# Patient Record
Sex: FEMALE | Race: WHITE
[De-identification: names, ages, dates, MRNs, and addresses within clinical notes are randomized per-mention and may not be internally consistent; named-entity substitution may affect disease eponyms.]

---

## 2019-07-22 ENCOUNTER — HOSPITAL ENCOUNTER (OUTPATIENT)
Dept: HOSPITAL 10 - E/R | Age: 64
Setting detail: OBSERVATION
LOS: 1 days | Discharge: HOME | End: 2019-07-23
Attending: PEDIATRICS | Admitting: PEDIATRICS
Payer: COMMERCIAL

## 2019-07-22 ENCOUNTER — HOSPITAL ENCOUNTER (OUTPATIENT)
Dept: HOSPITAL 91 - E/R | Age: 64
Setting detail: OBSERVATION
LOS: 1 days | Discharge: HOME | End: 2019-07-23
Payer: COMMERCIAL

## 2019-07-22 VITALS
WEIGHT: 125.44 LBS | WEIGHT: 125.44 LBS | BODY MASS INDEX: 23.08 KG/M2 | HEIGHT: 62 IN | BODY MASS INDEX: 23.08 KG/M2 | HEIGHT: 62 IN

## 2019-07-22 DIAGNOSIS — R07.89: Primary | ICD-10-CM

## 2019-07-22 DIAGNOSIS — E78.5: ICD-10-CM

## 2019-07-22 DIAGNOSIS — I10: ICD-10-CM

## 2019-07-22 DIAGNOSIS — Z79.4: ICD-10-CM

## 2019-07-22 DIAGNOSIS — E11.9: ICD-10-CM

## 2019-07-22 LAB
ADD MAN DIFF?: NO
ALANINE AMINOTRANSFERASE: 31 IU/L (ref 13–69)
ALBUMIN/GLOBULIN RATIO: 1.35
ALBUMIN: 5 G/DL (ref 3.3–4.9)
ALKALINE PHOSPHATASE: 91 IU/L (ref 42–121)
ANION GAP: 11 (ref 5–13)
ASPARTATE AMINO TRANSFERASE: 30 IU/L (ref 15–46)
BASOPHIL #: 0.1 10^3/UL (ref 0–0.1)
BASOPHILS %: 0.7 % (ref 0–2)
BILIRUBIN,DIRECT: 0 MG/DL (ref 0–0.2)
BILIRUBIN,TOTAL: 0.7 MG/DL (ref 0.2–1.3)
BLOOD UREA NITROGEN: 20 MG/DL (ref 7–20)
CALCIUM: 10.2 MG/DL (ref 8.4–10.2)
CARBON DIOXIDE: 27 MMOL/L (ref 21–31)
CHLORIDE: 105 MMOL/L (ref 97–110)
CREATININE: 1.07 MG/DL (ref 0.44–1)
EOSINOPHILS #: 0.3 10^3/UL (ref 0–0.5)
EOSINOPHILS %: 2.8 % (ref 0–7)
GLOBULIN: 3.7 G/DL (ref 1.3–3.2)
GLUCOSE: 208 MG/DL (ref 70–220)
HEMATOCRIT: 42.9 % (ref 37–47)
HEMOGLOBIN: 14.2 G/DL (ref 12–16)
IMMATURE GRANS #M: 0.05 10^3/UL (ref 0–0.03)
IMMATURE GRANS % (M): 0.5 % (ref 0–0.43)
LIPASE: 194 U/L (ref 23–300)
LYMPHOCYTES #: 2.6 10^3/UL (ref 0.8–2.9)
LYMPHOCYTES %: 28.3 % (ref 15–51)
MEAN CORPUSCULAR HEMOGLOBIN: 28.1 PG (ref 29–33)
MEAN CORPUSCULAR HGB CONC: 33.1 G/DL (ref 32–37)
MEAN CORPUSCULAR VOLUME: 84.8 FL (ref 82–101)
MEAN PLATELET VOLUME: 9.4 FL (ref 7.4–10.4)
MONOCYTE #: 0.7 10^3/UL (ref 0.3–0.9)
MONOCYTES %: 7.1 % (ref 0–11)
NEUTROPHIL #: 5.6 10^3/UL (ref 1.6–7.5)
NEUTROPHILS %: 60.6 % (ref 39–77)
NUCLEATED RED BLOOD CELLS #: 0 10^3/UL (ref 0–0)
NUCLEATED RED BLOOD CELLS%: 0 /100WBC (ref 0–0)
PLATELET COUNT: 316 10^3/UL (ref 140–415)
POTASSIUM: 3.9 MMOL/L (ref 3.5–5.1)
RED BLOOD COUNT: 5.06 10^6/UL (ref 4.2–5.4)
RED CELL DISTRIBUTION WIDTH: 11.9 % (ref 11.5–14.5)
SODIUM: 143 MMOL/L (ref 135–144)
TOTAL PROTEIN: 8.7 G/DL (ref 6.1–8.1)
WHITE BLOOD COUNT: 9.2 10^3/UL (ref 4.8–10.8)

## 2019-07-22 PROCEDURE — 99285 EMERGENCY DEPT VISIT HI MDM: CPT

## 2019-07-22 PROCEDURE — 85378 FIBRIN DEGRADE SEMIQUANT: CPT

## 2019-07-22 PROCEDURE — 80053 COMPREHEN METABOLIC PANEL: CPT

## 2019-07-22 PROCEDURE — 85651 RBC SED RATE NONAUTOMATED: CPT

## 2019-07-22 PROCEDURE — 83036 HEMOGLOBIN GLYCOSYLATED A1C: CPT

## 2019-07-22 PROCEDURE — 82962 GLUCOSE BLOOD TEST: CPT

## 2019-07-22 PROCEDURE — 83690 ASSAY OF LIPASE: CPT

## 2019-07-22 PROCEDURE — 71045 X-RAY EXAM CHEST 1 VIEW: CPT

## 2019-07-22 PROCEDURE — 84443 ASSAY THYROID STIM HORMONE: CPT

## 2019-07-22 PROCEDURE — 82550 ASSAY OF CK (CPK): CPT

## 2019-07-22 PROCEDURE — 93005 ELECTROCARDIOGRAM TRACING: CPT

## 2019-07-22 PROCEDURE — 84484 ASSAY OF TROPONIN QUANT: CPT

## 2019-07-22 PROCEDURE — 81001 URINALYSIS AUTO W/SCOPE: CPT

## 2019-07-22 PROCEDURE — 85025 COMPLETE CBC W/AUTO DIFF WBC: CPT

## 2019-07-22 PROCEDURE — 96374 THER/PROPH/DIAG INJ IV PUSH: CPT

## 2019-07-22 PROCEDURE — 36415 COLL VENOUS BLD VENIPUNCTURE: CPT

## 2019-07-22 PROCEDURE — 82553 CREATINE MB FRACTION: CPT

## 2019-07-22 RX ADMIN — ASPIRIN 81 MG CHEWABLE TABLET 1 MG: 81 TABLET CHEWABLE at 23:30

## 2019-07-22 RX ADMIN — THIAMINE HYDROCHLORIDE 1 MLS/HR: 100 INJECTION, SOLUTION INTRAMUSCULAR; INTRAVENOUS at 23:30

## 2019-07-22 RX ADMIN — KETOROLAC TROMETHAMINE 1 MG: 15 INJECTION, SOLUTION INTRAMUSCULAR; INTRAVENOUS at 23:30

## 2019-07-22 NOTE — ERD
ER Documentation


Chief Complaint


Chief Complaint





on and off  cp x 1 day





HPI


This is a 63-year-old woman with a history of diabetes mellitus and hypertension


presenting with vague sharp nonexertional nonradiating substernal chest pain 


intermittent for 1 day usually lasting about 20 minutes.  She denies previous 


similar episodes.  She denies shortness of breath, no cough, no fevers or 


chills, no vomiting or diarrhea





ROS


All systems reviewed and are negative except as per history of present illness.





Allergies


Allergies:  


Coded Allergies:  


     No Known Drug Allergies (Verified  Allergy, Unknown, 7/22/19)





PMhx/Soc


Diabetes mellitus, hypertension





FmHx


Family History:  No diabetes





Physical Exam


Vitals





Vital Signs


  Date      Temp  Pulse  Resp  B/P (MAP)   Pulse Ox  O2          O2 Flow    FiO2


Time                                                 Delivery    Rate


   7/23/19           99    18      183/77        96  Room Air


     00:56                          (112)


   7/22/19  98.6    107    20      188/83        98


     22:41                          (118)





Physical Exam


GENERAL: Well-developed, well-nourished, well-hydrated, in no apparent distress,


looks nontoxic in appearance


CARDIAC: Regular rate and rhythm, no murmurs rubs or gallops


LUNGS: Clear bilaterally no wheezing crackles or stridor


ABDOMEN: Soft nontender, no guarding, no rigidity, no rebound, no psoas sign no 


obturator sign. 


SKIN: Warm and dry to touch, no abrasions, contusions, or hematomas, no 


lacerations, no ecchymosis, no target lesions, and without ulcers


EXTREMITIES: No clubbing cyanosis or edema, calves are bilaterally symmetrical, 


no Homans sign, no popliteal cord sign. Distal pulses equal and bilateral


PSYCH: Normal affect without agitation or irritability


Result Diagram:  


7/22/19 2320                                                                    


           7/22/19 2320





Results 24 hrs





Laboratory Tests


              Test
                                  7/22/19
23:20


              White Blood Count                       9.2 10^3/ul


              Red Blood Count                        5.06 10^6/ul


              Hemoglobin                                14.2 g/dl


              Hematocrit                                   42.9 %


              Mean Corpuscular Volume                     84.8 fl


              Mean Corpuscular Hemoglobin                 28.1 pg


              Mean Corpuscular Hemoglobin
Concent      33.1 g/dl 



              Red Cell Distribution Width                  11.9 %


              Platelet Count                          316 10^3/UL


              Mean Platelet Volume                         9.4 fl


              Immature Granulocytes %                     0.500 %


              Neutrophils %                                60.6 %


              Lymphocytes %                                28.3 %


              Monocytes %                                   7.1 %


              Eosinophils %                                 2.8 %


              Basophils %                                   0.7 %


              Nucleated Red Blood Cells %             0.0 /100WBC


              Immature Granulocytes #               0.050 10^3/ul


              Neutrophils #                           5.6 10^3/ul


              Lymphocytes #                           2.6 10^3/ul


              Monocytes #                             0.7 10^3/ul


              Eosinophils #                           0.3 10^3/ul


              Basophils #                             0.1 10^3/ul


              Nucleated Red Blood Cells #             0.0 10^3/ul


              Urine Color                          COLORLESS


              Urine Clarity                        CLEAR


              Urine pH                                        6.0


              Urine Specific Gravity                        1.003


              Urine Ketones                        NEGATIVE mg/dL


              Urine Nitrite                        NEGATIVE mg/dL


              Urine Bilirubin                      NEGATIVE mg/dL


              Urine Urobilinogen                   NEGATIVE mg/dL


              Urine Leukocyte Esterase
            NEGATIVE
Thaddeus/ul


              Urine Microscopic RBC                        0 /HPF


              Urine Microscopic WBC                        0 /HPF


              Urine Hemoglobin                           1+ mg/dL


              Urine Glucose                              1+ mg/dL


              Urine Total Protein                        1+ mg/dl


              Sodium Level                             143 mmol/L


              Potassium Level                          3.9 mmol/L


              Chloride Level                           105 mmol/L


              Carbon Dioxide Level                      27 mmol/L


              Anion Gap                                        11


              Blood Urea Nitrogen                        20 mg/dl


              Creatinine                               1.07 mg/dl


              Est Glomerular Filtrat Rate
mL/min       52 mL/min 



              Glucose Level                             208 mg/dl


              Calcium Level                            10.2 mg/dl


              Total Bilirubin                           0.7 mg/dl


              Direct Bilirubin                         0.00 mg/dl


              Indirect Bilirubin                        0.7 mg/dl


              Aspartate Amino Transf
(AST/SGOT)          30 IU/L 



              Alanine Aminotransferase
(ALT/SGPT)        31 IU/L 



              Alkaline Phosphatase                        91 IU/L


              Troponin I                           < 0.012 ng/ml


              Total Protein                              8.7 g/dl


              Albumin                                    5.0 g/dl


              Globulin                                  3.70 g/dl


              Albumin/Globulin Ratio                         1.35


              Lipase                                      194 U/L





Current Medications


 Medications
   Dose
          Sig/Ayana
       Start Time
   Status  Last


 (Trade)       Ordered        Route
 PRN     Stop Time              Admin
Dose


                              Reason                                Admin


 Sodium         1,000 ml @ 
   Q1H STAT
      7/22/19       DC           7/22/19


Chloride       1,000 mls/hr   IV
            23:19
                       23:30



                                             7/23/19 00:18


 Ketorolac
     15 mg          ONCE  STAT
    7/22/19       DC           7/22/19


Tromethamine
                 IV
            23:19
                       23:30



 (Toradol)                                   7/22/19 23:20


 Aspirin
       324 mg         ONCE  ONCE
    7/22/19       DC           7/22/19


(Aspirin)                     PO
            23:30
                       23:30



                                             7/22/19 23:31


 Lorazepam
     0.5 mg         ONCE  ONCE
    7/23/19       DC           7/23/19


(Ativan)                      PO
            00:30
                       00:19



                                             7/23/19 00:31


 Clonidine
     0.1 mg         ONCE  ONCE
    7/23/19       DC           7/23/19


(Catapres)                    PO
            00:30
                       00:55



                                             7/23/19 00:31


 IV Flush
      3 ml           PER            7/23/19                



(NS 3 ml)                     PROTOCOL
 IV
  02:00



 Ondansetron    4 mg           Q6H  PRN
      7/23/19                



HCl
  (Zofran                 PO
            02:00



Tab)                          NAUSEA/VOMITI


                              NG


 Aspirin
       81 mg          DAILY
 PO
     7/23/19                



(Aspirin)                                    09:00



                650 mg         Q6H  PRN
      7/23/19                



Acetaminophen                 PO
 .PAIN 1-3  02:00




  (Tylenol                   OR TEMP


Tab)


                1 tab          Q6H  PRN
      7/23/19                



Acetaminophen                 PO
 .PAIN 4-6  02:00



/



Hydrocodone


Bitart



(Norco


(5/325))


 Docusate       100 mg         Q12H  PRN
     7/23/19                



Sodium
                       PO
            02:00



(Colace)                      .CONSTIPATION


 Famotidine
    20 mg          Q12
 PO
       7/23/19                



(Pepcid)                                     02:00



 Enoxaparin     30 mg          DAILY
 SC
     7/23/19                



Sodium
                                      09:00



(Lovenox)








Procedures/MDM


IV line was established patient was placed on cardiac monitor rhythm strip 


revealed a narrow complex tachycardia at 110 bpm with upright P and T waves.  


Patient was afebrile





EKG performed, read by me revealed a sinus tachycardia at 105 bpm, normal axis, 


narrow QRS complex, no concerning ST elevations or depressions noted, PVCs





I administered Toradol 50 mg IV for pain and aspirin 324 mg p.o. for 


cardioprotective measures





Patient also received 1 L normal saline IV and clonidine 0.1 mg p.o. for 


hypertension.  Patient was anxious and I administered lorazepam 0.5 mg p.o. x1





CBC and electrolytes are normal, liver function tests are normal, troponin was 


negative, urinalysis was negative for infection.





Patient will be admitted to telemetry setting for continued medical management 


cardiology consultation.





Departure


Diagnosis:  


   Primary Impression:  


   Chest pain


   Chest pain type:  unspecified  Qualified Codes:  R07.9 - Chest pain, 


   unspecified


   Additional Impression:  


   Hypertension


   Hypertension type:  essential hypertension  Qualified Codes:  I10 - Essential


   (primary) hypertension


Condition:  AXEL Urena MD             Jul 22, 2019 23:12

## 2019-07-23 VITALS — HEART RATE: 67 BPM | SYSTOLIC BLOOD PRESSURE: 123 MMHG | RESPIRATION RATE: 18 BRPM | DIASTOLIC BLOOD PRESSURE: 59 MMHG

## 2019-07-23 VITALS — DIASTOLIC BLOOD PRESSURE: 62 MMHG | RESPIRATION RATE: 20 BRPM | SYSTOLIC BLOOD PRESSURE: 123 MMHG | HEART RATE: 73 BPM

## 2019-07-23 VITALS — HEART RATE: 70 BPM | SYSTOLIC BLOOD PRESSURE: 128 MMHG | RESPIRATION RATE: 20 BRPM | DIASTOLIC BLOOD PRESSURE: 63 MMHG

## 2019-07-23 VITALS — HEART RATE: 75 BPM

## 2019-07-23 VITALS — HEART RATE: 87 BPM

## 2019-07-23 LAB
ADD UMIC: YES
CK INDEX: 1.5
CK INDEX: 1.6
CK-MB: 2.03 NG/ML (ref 0–2.4)
CK-MB: 2.34 NG/ML (ref 0–2.4)
CREATINE KINASE: 139 IU/L (ref 23–200)
CREATINE KINASE: 145 IU/L (ref 23–200)
D-DIMER: 364.66 NG/ML (ref ?–460)
ERYTHROCYTE SEDIMENTATION RATE: 8 MM/HR (ref 0–30)
HEMOGLOBIN A1C: 6.5 % (ref 0–5.9)
THYROID STIMULATING HORMONE: 0.5 MIU/L (ref 0.47–4.68)
TROPONIN-I: < 0.012 NG/ML (ref 0–0.12)
UR ASCORBIC ACID: NEGATIVE MG/DL
UR BILIRUBIN (DIP): NEGATIVE MG/DL
UR BLOOD (DIP): (no result) MG/DL
UR CLARITY: CLEAR
UR COLOR: COLORLESS
UR GLUCOSE (DIP): (no result) MG/DL
UR KETONES (DIP): NEGATIVE MG/DL
UR LEUKOCYTE ESTERASE (DIP): NEGATIVE LEU/UL
UR NITRITE (DIP): NEGATIVE MG/DL
UR PH (DIP): 6 (ref 5–9)
UR RBC: 0 /HPF (ref 0–5)
UR SPECIFIC GRAVITY (DIP): 1 (ref 1–1.03)
UR TOTAL PROTEIN (DIP): (no result) MG/DL
UR UROBILINOGEN (DIP): NEGATIVE MG/DL
UR WBC: 0 /HPF (ref 0–5)

## 2019-07-23 RX ADMIN — FAMOTIDINE SCH MG: 20 TABLET ORAL at 09:31

## 2019-07-23 RX ADMIN — FAMOTIDINE 1 MG: 20 TABLET ORAL at 09:31

## 2019-07-23 RX ADMIN — FAMOTIDINE SCH MG: 20 TABLET ORAL at 03:16

## 2019-07-23 RX ADMIN — LORAZEPAM 1 MG: 0.5 TABLET ORAL at 00:19

## 2019-07-23 RX ADMIN — FAMOTIDINE 1 MG: 20 TABLET ORAL at 03:16

## 2019-07-23 RX ADMIN — ENOXAPARIN SODIUM 1 MG: 100 INJECTION SUBCUTANEOUS at 09:00

## 2019-07-23 RX ADMIN — ASPIRIN 81 MG CHEWABLE TABLET 1 MG: 81 TABLET CHEWABLE at 09:31

## 2019-07-23 NOTE — HP
DATE OF ADMISSION: 07/23/2019

 

CHIEF COMPLAINT:  Chest pain.

 

HISTORY OF PRESENT ILLNESS:  A 63-year-old female with a history of hypertension, type 2 diabetes thuy
litus, and hyperlipidemia, presented to Emergency Room with complaint of sharp chest pain during the 
day.  Patient had recurrent pain about 8:00 in the evening, which lasted more than 2 hours.  The tita
ent had persistent chest pain when she arrived to the Emergency Room.  She denies chest pressure or t
ightness.  No shortness of breath.  No nausea, vomiting, or diaphoresis.  The pain resolved after she
 was admitted.  The patient denies any additional symptoms.

 

Initial evaluation was unremarkable.  Serial troponins have been normal.  A 12-lead EKG did not show 
any ST or T-wave changes.

 

PAST MEDICAL HISTORY:

1.  Hypertension.

2.  Type 2 diabetes mellitus.

3.  Hyperlipidemia.

 

MEDICATIONS PRIOR TO ADMISSION:

1.  Amlodipine 10 mg daily.

2.  Lipitor 10 mg daily.

3.  Benazepril 40 mg daily.

4.  Glimepiride 4 mg b.i.d. 

5.  Glargine insulin 20 units at bedtime.

6.  Regular insulin and metformin 1000 mg b.i.d.

 

SOCIAL HISTORY:  Patient lives at home.  Her children were at the bedside.  She denies tobacco or alc
ohol use.

 

PHYSICAL EXAMINATION:

GENERAL:  Well-developed, well-nourished female who is in no apparent distress.

VITAL SIGNS:  Blood pressure 123/59, pulse 67, respiration 18, temperature 98.

HEENT:  Extraocular muscles intact.  Pupils equal, reactive to light bilaterally.  Sclerae are anicte
marleny.  Oropharynx is clear and moist.

NECK:  Supple, no JVD, no carotid bruits.

LUNGS:  Clear to auscultation bilaterally.

CHEST:  Nontender to palpation.

CARDIAC:  Regular rate and rhythm.  No murmurs, rubs or gallops.

ABDOMEN:  Soft, nontender, nondistended, normoactive bowel sounds.

EXTREMITIES:  No clubbing, cyanosis, or edema.

NEUROLOGICAL:  Nonfocal.

 

LABORATORY DATA:  CBC within normal limits.  Basic metabolic panel within normal limit except the blo
od sugar of 208.  Hemoglobin A1c was 6.5.  TSH was 0.5.  Chest x-ray showed no evidence of active car
diopulmonary disease.  Mild aortic atherosclerosis was noted.

 

ASSESSMENT:

1.  A 63-year-old female presenting with atypical chest pain.  Her pain was sharp and lasted for more
 than 2 hours.  There were no associated symptoms.  Acute myocardial infarction has been ruled out.

2.  Hypertension, well controlled.

3.  Type 2 diabetes mellitus.

4.  Hyperlipidemia.

 

PLAN:

1.  Place in tele observation and resume home medications.

2.  Discharge planning to home.

 

 

Dictated By: JESUS SCHMIDT/ALEXIA

DD:    07/23/2019 09:39:25

DT:    07/23/2019 11:15:19

Conf#: [id number]

DID#:  [id number]

CC: SRAVANTHI LEMUS MD;*EndCC*

## 2019-07-23 NOTE — DS
DATE OF ADMISSION: 07/23/2019

DATE OF DISCHARGE: 07/23/2019

 

DISCHARGE DIAGNOSES:

1.  A 63-year-old female with atypical chest pain.

2.  Hypertension.

3.  Type 2 diabetes mellitus.

4.  Hyperlipidemia.

 

HOSPITAL COURSE:  A 63-year-old female who presented to emergency room with complaint of sharp chest 
pain which lasted about 2 hours.  The patient denies chest tightness or pressure.  There were no asso
ciated symptoms of shortness of breath, nausea, vomiting, or diaphoresis.  The patient did not have a
ny exertional symptoms.  Initial evaluation was unremarkable.  Serial troponins were normal.  Hemoglo
bin A1c was 6.5.  The patient was discharged home in a stable condition.  She was asked to follow up 
with her PCP in 1 week.  The patient was completely asymptomatic at the time of discharge.

 

 

Dictated By: JESUS SANCHEZ MD

 

SK/NTS

DD:    07/23/2019 10:14:48

DT:    07/23/2019 13:06:07

Conf#: [id number]

DID#:  [id number]

CC: SRAVANTHI LEMUS MD;*EndCC*

## 2019-07-23 NOTE — PDOCDIS
Discharge Instructions


CONDITION


                 [id number]
Patient Condition:  Joton2p
Good








HOME CARE INSTRUCTIONS:


               [id number]
Diet Instructions:  [id number]
       [id number]
Activity Restrictions:  [id number]
No Restrictions








FOLLOW UP/APPOINTMENTS


Follow-up Plan


pcp 1 week











JESUS SANCHEZ MD                  Jul 23, 2019 09:18

## 2019-08-13 ENCOUNTER — HOSPITAL ENCOUNTER (EMERGENCY)
Dept: HOSPITAL 91 - E/R | Age: 64
LOS: 1 days | Discharge: HOME | End: 2019-08-14
Payer: COMMERCIAL

## 2019-08-13 ENCOUNTER — HOSPITAL ENCOUNTER (EMERGENCY)
Dept: HOSPITAL 10 - E/R | Age: 64
LOS: 1 days | Discharge: HOME | End: 2019-08-14
Payer: COMMERCIAL

## 2019-08-13 VITALS
HEIGHT: 59 IN | HEIGHT: 59 IN | BODY MASS INDEX: 25.24 KG/M2 | BODY MASS INDEX: 25.24 KG/M2 | WEIGHT: 125.22 LBS | WEIGHT: 125.22 LBS

## 2019-08-13 DIAGNOSIS — R10.13: Primary | ICD-10-CM

## 2019-08-13 DIAGNOSIS — Z79.4: ICD-10-CM

## 2019-08-13 DIAGNOSIS — I10: ICD-10-CM

## 2019-08-13 DIAGNOSIS — E11.9: ICD-10-CM

## 2019-08-13 LAB
ADD MAN DIFF?: NO
ADD UMIC: NO
ALANINE AMINOTRANSFERASE: 26 IU/L (ref 13–69)
ALBUMIN/GLOBULIN RATIO: 1.43
ALBUMIN: 4.6 G/DL (ref 3.3–4.9)
ALKALINE PHOSPHATASE: 105 IU/L (ref 42–121)
ANION GAP: 12 (ref 5–13)
ASPARTATE AMINO TRANSFERASE: 30 IU/L (ref 15–46)
BASOPHIL #: 0 10^3/UL (ref 0–0.1)
BASOPHILS %: 0.4 % (ref 0–2)
BILIRUBIN,DIRECT: 0 MG/DL (ref 0–0.2)
BILIRUBIN,TOTAL: 0.6 MG/DL (ref 0.2–1.3)
BLOOD UREA NITROGEN: 19 MG/DL (ref 7–20)
CALCIUM: 9.4 MG/DL (ref 8.4–10.2)
CARBON DIOXIDE: 30 MMOL/L (ref 21–31)
CHLORIDE: 98 MMOL/L (ref 97–110)
CREATININE: 0.83 MG/DL (ref 0.44–1)
EOSINOPHILS #: 0.1 10^3/UL (ref 0–0.5)
EOSINOPHILS %: 0.8 % (ref 0–7)
GLOBULIN: 3.2 G/DL (ref 1.3–3.2)
GLUCOSE: 323 MG/DL (ref 70–220)
HEMATOCRIT: 41.7 % (ref 37–47)
HEMOGLOBIN: 14.2 G/DL (ref 12–16)
IMMATURE GRANS #M: 0.04 10^3/UL (ref 0–0.03)
IMMATURE GRANS % (M): 0.4 % (ref 0–0.43)
LIPASE: 137 U/L (ref 23–300)
LYMPHOCYTES #: 1.2 10^3/UL (ref 0.8–2.9)
LYMPHOCYTES %: 11.4 % (ref 15–51)
MEAN CORPUSCULAR HEMOGLOBIN: 28.7 PG (ref 29–33)
MEAN CORPUSCULAR HGB CONC: 34.1 G/DL (ref 32–37)
MEAN CORPUSCULAR VOLUME: 84.2 FL (ref 82–101)
MEAN PLATELET VOLUME: 9.5 FL (ref 7.4–10.4)
MONOCYTE #: 0.5 10^3/UL (ref 0.3–0.9)
MONOCYTES %: 4.8 % (ref 0–11)
NEUTROPHIL #: 8.4 10^3/UL (ref 1.6–7.5)
NEUTROPHILS %: 82.2 % (ref 39–77)
NUCLEATED RED BLOOD CELLS #: 0 10^3/UL (ref 0–0)
NUCLEATED RED BLOOD CELLS%: 0 /100WBC (ref 0–0)
PLATELET COUNT: 299 10^3/UL (ref 140–415)
POTASSIUM: 3.7 MMOL/L (ref 3.5–5.1)
RED BLOOD COUNT: 4.95 10^6/UL (ref 4.2–5.4)
RED CELL DISTRIBUTION WIDTH: 12.1 % (ref 11.5–14.5)
SODIUM: 140 MMOL/L (ref 135–144)
TOTAL PROTEIN: 7.8 G/DL (ref 6.1–8.1)
TROPONIN-I: < 0.012 NG/ML (ref 0–0.12)
UR ASCORBIC ACID: NEGATIVE MG/DL
UR BILIRUBIN (DIP): NEGATIVE MG/DL
UR BLOOD (DIP): NEGATIVE MG/DL
UR CLARITY: CLEAR
UR COLOR: COLORLESS
UR GLUCOSE (DIP): (no result) MG/DL
UR KETONES (DIP): (no result) MG/DL
UR LEUKOCYTE ESTERASE (DIP): NEGATIVE LEU/UL
UR NITRITE (DIP): NEGATIVE MG/DL
UR PH (DIP): 8 (ref 5–9)
UR SPECIFIC GRAVITY (DIP): 1.01 (ref 1–1.03)
UR TOTAL PROTEIN (DIP): NEGATIVE MG/DL
UR UROBILINOGEN (DIP): NEGATIVE MG/DL
WHITE BLOOD COUNT: 10.2 10^3/UL (ref 4.8–10.8)

## 2019-08-13 PROCEDURE — 96375 TX/PRO/DX INJ NEW DRUG ADDON: CPT

## 2019-08-13 PROCEDURE — 93005 ELECTROCARDIOGRAM TRACING: CPT

## 2019-08-13 PROCEDURE — 80053 COMPREHEN METABOLIC PANEL: CPT

## 2019-08-13 PROCEDURE — 85025 COMPLETE CBC W/AUTO DIFF WBC: CPT

## 2019-08-13 PROCEDURE — 36415 COLL VENOUS BLD VENIPUNCTURE: CPT

## 2019-08-13 PROCEDURE — 83690 ASSAY OF LIPASE: CPT

## 2019-08-13 PROCEDURE — 84484 ASSAY OF TROPONIN QUANT: CPT

## 2019-08-13 PROCEDURE — 99285 EMERGENCY DEPT VISIT HI MDM: CPT

## 2019-08-13 PROCEDURE — 81003 URINALYSIS AUTO W/O SCOPE: CPT

## 2019-08-13 PROCEDURE — 96374 THER/PROPH/DIAG INJ IV PUSH: CPT

## 2019-08-13 PROCEDURE — 76705 ECHO EXAM OF ABDOMEN: CPT

## 2019-08-13 RX ADMIN — ONDANSETRON HYDROCHLORIDE 1 MG: 2 INJECTION, SOLUTION INTRAMUSCULAR; INTRAVENOUS at 23:04

## 2019-08-13 RX ADMIN — LIDOCAINE HYDROCHLORIDE 1 MLS/HR: 10 INJECTION, SOLUTION EPIDURAL; INFILTRATION; INTRACAUDAL; PERINEURAL at 23:05

## 2019-08-14 VITALS — RESPIRATION RATE: 16 BRPM | HEART RATE: 91 BPM | SYSTOLIC BLOOD PRESSURE: 132 MMHG | DIASTOLIC BLOOD PRESSURE: 72 MMHG
